# Patient Record
Sex: MALE | Race: WHITE | URBAN - METROPOLITAN AREA
[De-identification: names, ages, dates, MRNs, and addresses within clinical notes are randomized per-mention and may not be internally consistent; named-entity substitution may affect disease eponyms.]

---

## 2019-11-15 ENCOUNTER — HOSPITAL ENCOUNTER (OUTPATIENT)
Age: 64
Setting detail: SPECIMEN
Discharge: HOME | End: 2019-11-15

## 2019-11-20 LAB — SURGICAL PATHOLOGY REPORT: NORMAL

## 2024-02-07 ENCOUNTER — TRANSCRIBE ORDERS (OUTPATIENT)
Dept: ADMINISTRATIVE | Age: 69
End: 2024-02-07

## 2024-02-07 DIAGNOSIS — R01.1 HEART MURMUR: ICD-10-CM

## 2024-02-07 DIAGNOSIS — R09.89 BILATERAL CAROTID BRUITS: Primary | ICD-10-CM

## 2024-02-07 DIAGNOSIS — R06.09 DOE (DYSPNEA ON EXERTION): ICD-10-CM

## 2024-02-16 ENCOUNTER — HOSPITAL ENCOUNTER (OUTPATIENT)
Dept: VASCULAR LAB | Age: 69
End: 2024-02-16
Attending: FAMILY MEDICINE
Payer: MEDICARE

## 2024-02-16 ENCOUNTER — HOSPITAL ENCOUNTER (OUTPATIENT)
Age: 69
Discharge: HOME | End: 2024-02-18
Attending: FAMILY MEDICINE
Payer: MEDICARE

## 2024-02-16 VITALS — HEIGHT: 71 IN | BODY MASS INDEX: 25.9 KG/M2 | WEIGHT: 185 LBS

## 2024-02-16 DIAGNOSIS — R01.1 HEART MURMUR: ICD-10-CM

## 2024-02-16 DIAGNOSIS — R09.89 BILATERAL CAROTID BRUITS: ICD-10-CM

## 2024-02-16 DIAGNOSIS — R06.09 DOE (DYSPNEA ON EXERTION): ICD-10-CM

## 2024-02-16 LAB
ECHO AO ASC DIAM: 3 CM
ECHO AO ASCENDING AORTA INDEX: 1.47 CM/M2
ECHO AO ROOT DIAM: 3.4 CM
ECHO AO ROOT INDEX: 1.67 CM/M2
ECHO AV AREA PEAK VELOCITY: 4.3 CM2
ECHO AV AREA/BSA PEAK VELOCITY: 2.1 CM2/M2
ECHO AV PEAK GRADIENT: 5 MMHG
ECHO AV PEAK VELOCITY: 1.2 M/S
ECHO AV VELOCITY RATIO: 0.92
ECHO BSA: 2.05 M2
ECHO EST RA PRESSURE: 3 MMHG
ECHO IVC PROX: 1.4 CM
ECHO LA AREA 2C: 21.4 CM2
ECHO LA AREA 4C: 17.7 CM2
ECHO LA DIAMETER INDEX: 1.72 CM/M2
ECHO LA DIAMETER: 3.5 CM
ECHO LA MAJOR AXIS: 5.3 CM
ECHO LA MINOR AXIS: 5.2 CM
ECHO LA TO AORTIC ROOT RATIO: 1.03
ECHO LA VOL BP: 57 ML (ref 18–58)
ECHO LA VOL MOD A2C: 69 ML (ref 18–58)
ECHO LA VOL MOD A4C: 47 ML (ref 18–58)
ECHO LA VOL/BSA BIPLANE: 28 ML/M2 (ref 16–34)
ECHO LA VOLUME INDEX MOD A2C: 34 ML/M2 (ref 16–34)
ECHO LA VOLUME INDEX MOD A4C: 23 ML/M2 (ref 16–34)
ECHO LV E' LATERAL VELOCITY: 9 CM/S
ECHO LV E' SEPTAL VELOCITY: 6 CM/S
ECHO LV FRACTIONAL SHORTENING: 46 % (ref 28–44)
ECHO LV INTERNAL DIMENSION DIASTOLE INDEX: 2.45 CM/M2
ECHO LV INTERNAL DIMENSION DIASTOLIC: 5 CM (ref 4.2–5.9)
ECHO LV INTERNAL DIMENSION SYSTOLIC INDEX: 1.32 CM/M2
ECHO LV INTERNAL DIMENSION SYSTOLIC: 2.7 CM
ECHO LV IVSD: 0.7 CM (ref 0.6–1)
ECHO LV MASS 2D: 125.1 G (ref 88–224)
ECHO LV MASS INDEX 2D: 61.3 G/M2 (ref 49–115)
ECHO LV POSTERIOR WALL DIASTOLIC: 0.8 CM (ref 0.6–1)
ECHO LV RELATIVE WALL THICKNESS RATIO: 0.32
ECHO LVOT AREA: 4.5 CM2
ECHO LVOT DIAM: 2.4 CM
ECHO LVOT MEAN GRADIENT: 3 MMHG
ECHO LVOT PEAK GRADIENT: 5 MMHG
ECHO LVOT PEAK VELOCITY: 1.1 M/S
ECHO LVOT STROKE VOLUME INDEX: 54.5 ML/M2
ECHO LVOT SV: 111.2 ML
ECHO LVOT VTI: 24.6 CM
ECHO MV A VELOCITY: 0.7 M/S
ECHO MV E DECELERATION TIME (DT): 218 MS
ECHO MV E VELOCITY: 0.73 M/S
ECHO MV E/A RATIO: 1.04
ECHO MV E/E' LATERAL: 8.11
ECHO MV E/E' RATIO (AVERAGED): 10.14
ECHO PV MAX VELOCITY: 1 M/S
ECHO PV PEAK GRADIENT: 4 MMHG
ECHO RA AREA 4C: 16.8 CM2
ECHO RA END SYSTOLIC VOLUME APICAL 4 CHAMBER INDEX BSA: 19 ML/M2
ECHO RA VOLUME: 39 ML
ECHO RIGHT VENTRICULAR SYSTOLIC PRESSURE (RVSP): 13 MMHG
ECHO RV BASAL DIMENSION: 4.4 CM
ECHO RV FREE WALL PEAK S': 14 CM/S
ECHO RV TAPSE: 2.6 CM (ref 1.7–?)
ECHO TV REGURGITANT MAX VELOCITY: 1.6 M/S
ECHO TV REGURGITANT PEAK GRADIENT: 10 MMHG
VAS LEFT BULB EDV: 15.6 CM/S
VAS LEFT BULB PSV: 84 CM/S
VAS LEFT CCA DIST EDV: 22.2 CM/S
VAS LEFT CCA DIST PSV: 103 CM/S
VAS LEFT CCA MID EDV: 18.6 CM/S
VAS LEFT CCA MID PSV: 103 CM/S
VAS LEFT CCA PROX EDV: 14.4 CM/S
VAS LEFT CCA PROX PSV: 92.4 CM/S
VAS LEFT ECA EDV: 11.4 CM/S
VAS LEFT ECA PSV: 85.8 CM/S
VAS LEFT ICA DIST EDV: 26.3 CM/S
VAS LEFT ICA DIST PSV: 84.4 CM/S
VAS LEFT ICA MID EDV: 22.6 CM/S
VAS LEFT ICA MID PSV: 77.9 CM/S
VAS LEFT ICA PROX EDV: 21.5 CM/S
VAS LEFT ICA PROX PSV: 72 CM/S
VAS LEFT ICA/CCA PSV: 0.82
VAS LEFT VERTEBRAL EDV: 15.1 CM/S
VAS LEFT VERTEBRAL PSV: 55.9 CM/S
VAS RIGHT BULB EDV: 9.5 CM/S
VAS RIGHT BULB PSV: 57.8 CM/S
VAS RIGHT CCA DIST EDV: 19.7 CM/S
VAS RIGHT CCA DIST PSV: 113 CM/S
VAS RIGHT CCA MID EDV: 17.7 CM/S
VAS RIGHT CCA MID PSV: 116 CM/S
VAS RIGHT CCA PROX EDV: 17.7 CM/S
VAS RIGHT CCA PROX PSV: 124 CM/S
VAS RIGHT ECA EDV: 12.2 CM/S
VAS RIGHT ECA PSV: 99.9 CM/S
VAS RIGHT ICA DIST EDV: 25.1 CM/S
VAS RIGHT ICA DIST PSV: 92.4 CM/S
VAS RIGHT ICA MID EDV: 23.8 CM/S
VAS RIGHT ICA MID PSV: 94.4 CM/S
VAS RIGHT ICA PROX EDV: 13.6 CM/S
VAS RIGHT ICA PROX PSV: 85.6 CM/S
VAS RIGHT ICA/CCA PSV: 0.84
VAS RIGHT VERTEBRAL EDV: 12.5 CM/S
VAS RIGHT VERTEBRAL PSV: 51.9 CM/S

## 2024-02-16 PROCEDURE — 93306 TTE W/DOPPLER COMPLETE: CPT

## 2024-02-16 PROCEDURE — 93880 EXTRACRANIAL BILAT STUDY: CPT

## 2024-02-16 PROCEDURE — 93306 TTE W/DOPPLER COMPLETE: CPT | Performed by: INTERNAL MEDICINE

## 2024-02-16 PROCEDURE — 93880 EXTRACRANIAL BILAT STUDY: CPT | Performed by: SURGERY

## 2024-11-20 RX ORDER — ATORVASTATIN CALCIUM 40 MG/1
40 TABLET, FILM COATED ORAL DAILY
COMMUNITY
Start: 2024-08-22 | End: 2024-11-20 | Stop reason: SDUPTHER

## 2024-11-20 RX ORDER — ATORVASTATIN CALCIUM 40 MG/1
40 TABLET, FILM COATED ORAL DAILY
Qty: 30 TABLET | Refills: 2 | Status: SHIPPED | OUTPATIENT
Start: 2024-11-20

## 2024-12-13 SDOH — HEALTH STABILITY: PHYSICAL HEALTH: ON AVERAGE, HOW MANY MINUTES DO YOU ENGAGE IN EXERCISE AT THIS LEVEL?: 20 MIN

## 2024-12-13 SDOH — HEALTH STABILITY: PHYSICAL HEALTH: ON AVERAGE, HOW MANY DAYS PER WEEK DO YOU ENGAGE IN MODERATE TO STRENUOUS EXERCISE (LIKE A BRISK WALK)?: 7 DAYS

## 2024-12-16 ENCOUNTER — OFFICE VISIT (OUTPATIENT)
Age: 69
End: 2024-12-16
Payer: MEDICARE

## 2024-12-16 VITALS
DIASTOLIC BLOOD PRESSURE: 70 MMHG | BODY MASS INDEX: 26.32 KG/M2 | HEART RATE: 72 BPM | TEMPERATURE: 98.1 F | SYSTOLIC BLOOD PRESSURE: 132 MMHG | RESPIRATION RATE: 16 BRPM | HEIGHT: 71 IN | OXYGEN SATURATION: 99 % | WEIGHT: 188 LBS

## 2024-12-16 DIAGNOSIS — L30.9 DERMATITIS: ICD-10-CM

## 2024-12-16 DIAGNOSIS — R23.8 DRY SCALP: ICD-10-CM

## 2024-12-16 DIAGNOSIS — I10 PRIMARY HYPERTENSION: ICD-10-CM

## 2024-12-16 DIAGNOSIS — R21 SKIN RASH: ICD-10-CM

## 2024-12-16 DIAGNOSIS — L85.3 DRY SKIN: ICD-10-CM

## 2024-12-16 DIAGNOSIS — N40.1 LOWER URINARY TRACT SYMPTOMS DUE TO BENIGN PROSTATIC HYPERPLASIA: ICD-10-CM

## 2024-12-16 DIAGNOSIS — E78.2 MIXED HYPERLIPIDEMIA: ICD-10-CM

## 2024-12-16 DIAGNOSIS — E11.9 TYPE 2 DIABETES MELLITUS WITHOUT COMPLICATION, WITHOUT LONG-TERM CURRENT USE OF INSULIN (HCC): ICD-10-CM

## 2024-12-16 DIAGNOSIS — M54.32 SCIATICA OF LEFT SIDE: ICD-10-CM

## 2024-12-16 DIAGNOSIS — M79.641 BILATERAL HAND PAIN: ICD-10-CM

## 2024-12-16 DIAGNOSIS — E55.9 VITAMIN D DEFICIENCY, UNSPECIFIED: ICD-10-CM

## 2024-12-16 DIAGNOSIS — M79.642 BILATERAL HAND PAIN: ICD-10-CM

## 2024-12-16 DIAGNOSIS — G45.3 AMAUROSIS FUGAX, BOTH EYES: Primary | ICD-10-CM

## 2024-12-16 DIAGNOSIS — Z11.59 NEED FOR HEPATITIS C SCREENING TEST: ICD-10-CM

## 2024-12-16 DIAGNOSIS — Z00.00 WELL ADULT EXAM: ICD-10-CM

## 2024-12-16 DIAGNOSIS — G56.03 CARPAL TUNNEL SYNDROME ON BOTH SIDES: ICD-10-CM

## 2024-12-16 PROBLEM — E78.5 HYPERLIPIDEMIA: Status: ACTIVE | Noted: 2023-11-27

## 2024-12-16 PROCEDURE — G0439 PPPS, SUBSEQ VISIT: HCPCS | Performed by: FAMILY MEDICINE

## 2024-12-16 PROCEDURE — 1159F MED LIST DOCD IN RCRD: CPT | Performed by: FAMILY MEDICINE

## 2024-12-16 PROCEDURE — 1123F ACP DISCUSS/DSCN MKR DOCD: CPT | Performed by: FAMILY MEDICINE

## 2024-12-16 PROCEDURE — 3075F SYST BP GE 130 - 139MM HG: CPT | Performed by: FAMILY MEDICINE

## 2024-12-16 PROCEDURE — 3078F DIAST BP <80 MM HG: CPT | Performed by: FAMILY MEDICINE

## 2024-12-16 PROCEDURE — 3017F COLORECTAL CA SCREEN DOC REV: CPT | Performed by: FAMILY MEDICINE

## 2024-12-16 PROCEDURE — G8484 FLU IMMUNIZE NO ADMIN: HCPCS | Performed by: FAMILY MEDICINE

## 2024-12-16 PROCEDURE — 3046F HEMOGLOBIN A1C LEVEL >9.0%: CPT | Performed by: FAMILY MEDICINE

## 2024-12-16 RX ORDER — AMLODIPINE BESYLATE 5 MG/1
5 TABLET ORAL DAILY
COMMUNITY
End: 2024-12-16 | Stop reason: SDUPTHER

## 2024-12-16 RX ORDER — GLIMEPIRIDE 2 MG/1
2 TABLET ORAL NIGHTLY
COMMUNITY
End: 2024-12-16 | Stop reason: SDUPTHER

## 2024-12-16 RX ORDER — TAMSULOSIN HYDROCHLORIDE 0.4 MG/1
0.4 CAPSULE ORAL DAILY
COMMUNITY
End: 2024-12-16 | Stop reason: SDUPTHER

## 2024-12-16 RX ORDER — HYDROCORTISONE 25 MG/ML
LOTION TOPICAL
Qty: 1 EACH | Refills: 2 | Status: SHIPPED | OUTPATIENT
Start: 2024-12-16

## 2024-12-16 RX ORDER — TAMSULOSIN HYDROCHLORIDE 0.4 MG/1
0.4 CAPSULE ORAL DAILY
Qty: 90 CAPSULE | Refills: 1 | Status: SHIPPED | OUTPATIENT
Start: 2024-12-16 | End: 2025-03-16

## 2024-12-16 RX ORDER — AMLODIPINE BESYLATE 5 MG/1
5 TABLET ORAL DAILY
Qty: 90 TABLET | Refills: 1 | Status: SHIPPED | OUTPATIENT
Start: 2024-12-16 | End: 2025-03-16

## 2024-12-16 RX ORDER — GLIMEPIRIDE 2 MG/1
2 TABLET ORAL NIGHTLY
Qty: 90 TABLET | Refills: 1 | Status: SHIPPED | OUTPATIENT
Start: 2024-12-16 | End: 2025-03-16

## 2024-12-16 RX ORDER — ATORVASTATIN CALCIUM 40 MG/1
40 TABLET, FILM COATED ORAL DAILY
Qty: 30 TABLET | Refills: 2 | Status: SHIPPED | OUTPATIENT
Start: 2024-12-16

## 2024-12-16 SDOH — ECONOMIC STABILITY: INCOME INSECURITY: HOW HARD IS IT FOR YOU TO PAY FOR THE VERY BASICS LIKE FOOD, HOUSING, MEDICAL CARE, AND HEATING?: NOT HARD AT ALL

## 2024-12-16 SDOH — ECONOMIC STABILITY: FOOD INSECURITY: WITHIN THE PAST 12 MONTHS, YOU WORRIED THAT YOUR FOOD WOULD RUN OUT BEFORE YOU GOT MONEY TO BUY MORE.: NEVER TRUE

## 2024-12-16 SDOH — ECONOMIC STABILITY: FOOD INSECURITY: WITHIN THE PAST 12 MONTHS, THE FOOD YOU BOUGHT JUST DIDN'T LAST AND YOU DIDN'T HAVE MONEY TO GET MORE.: NEVER TRUE

## 2024-12-16 ASSESSMENT — PATIENT HEALTH QUESTIONNAIRE - PHQ9
SUM OF ALL RESPONSES TO PHQ QUESTIONS 1-9: 0
1. LITTLE INTEREST OR PLEASURE IN DOING THINGS: NOT AT ALL
2. FEELING DOWN, DEPRESSED OR HOPELESS: NOT AT ALL
SUM OF ALL RESPONSES TO PHQ QUESTIONS 1-9: 0
SUM OF ALL RESPONSES TO PHQ9 QUESTIONS 1 & 2: 0

## 2024-12-16 NOTE — PROGRESS NOTES
[facility]  [facility]  [address]  [address]  [facility] [address]  Dept: [phone]     Date of Visit:  2024  Patient Name: Corky Krause   Patient :  1955     Corky Krause is a 69 y.o. male who presents today for an general visit to be evaluated for the following condition(s):  Chief Complaint   Patient presents with    New Patient     New patient coming from [facility], pain in thumb getting worse, discuss steroid shot, concerns for arthritis, or carpal tunnel. Right is worse than left but still bad. Bump on right shoulder that turned into a rash, possibly eczema , on left side as well. Discuss seeing a dermatologist. Discuss skin issues on scalp.        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  Patient has some pain to the thenar area some numbness he feels some weakness in the hand.  He is supposed to use a splint at night for his carpal tunnel brace that he forgets.  Current Outpatient Medications   Medication Sig Dispense Refill    atorvastatin (LIPITOR) 40 MG tablet Take 1 tablet by mouth daily 30 tablet 2    metFORMIN (GLUCOPHAGE) 500 MG tablet Take 2 tablets by mouth 2 times daily 60 tablet 2    amLODIPine (NORVASC) 5 MG tablet Take 1 tablet by mouth daily 90 tablet 1    glimepiride (AMARYL) 2 MG tablet Take 1 tablet by mouth nightly at bedtime. 90 tablet 1    tamsulosin (FLOMAX) 0.4 MG capsule Take 1 capsule by mouth daily 90 capsule 1    hydrocortisone (HYTONE) 2.5 % lotion Apply topically 2

## 2025-01-13 RX ORDER — ATORVASTATIN CALCIUM 40 MG/1
40 TABLET, FILM COATED ORAL DAILY
Qty: 90 TABLET | Refills: 1 | Status: SHIPPED | OUTPATIENT
Start: 2025-01-13

## 2025-01-13 RX ORDER — AMLODIPINE BESYLATE 5 MG/1
5 TABLET ORAL DAILY
Qty: 90 TABLET | Refills: 1 | Status: SHIPPED | OUTPATIENT
Start: 2025-01-13 | End: 2025-04-13

## 2025-01-21 DIAGNOSIS — E11.9 TYPE 2 DIABETES MELLITUS WITHOUT COMPLICATION, WITHOUT LONG-TERM CURRENT USE OF INSULIN (HCC): ICD-10-CM

## 2025-02-18 ENCOUNTER — TELEPHONE (OUTPATIENT)
Age: 70
End: 2025-02-18

## 2025-02-18 RX ORDER — OSELTAMIVIR PHOSPHATE 75 MG/1
75 CAPSULE ORAL 2 TIMES DAILY
Qty: 10 CAPSULE | Refills: 0 | Status: SHIPPED | OUTPATIENT
Start: 2025-02-18 | End: 2025-02-23

## 2025-02-18 NOTE — TELEPHONE ENCOUNTER
Pt has flu like sx Runny nose cough from chest has fever 100.7 three days with fatigue achy joints spouse is asking if there is anything he can take to get better pt did not get flu vaccination this year also the daughter is getting  this weekend pt asking what could help get better

## 2025-02-18 NOTE — TELEPHONE ENCOUNTER
Sounds like the flue will be nice marty get a fklue test we can start him on Tamiflue if he can come in tomorrow be checked

## 2025-04-28 NOTE — TELEPHONE ENCOUNTER
Request for 1 medication.      Next Visit Date:  No future appointments.    Health Maintenance   Topic Date Due    Diabetic foot exam  Never done    A1C test (Diabetic or Prediabetic)  Never done    Lipids  Never done    Diabetic Alb to Cr ratio (uACR) test  Never done    Diabetic retinal exam  Never done    Hepatitis C screen  Never done    Shingles vaccine (1 of 2) Never done    GFR test (Diabetes, CKD 3-4, OR last GFR 15-59)  12/01/2013    Respiratory Syncytial Virus (RSV) Pregnant or age 60 yrs+ (1 - Risk 60-74 years 1-dose series) Never done    Pneumococcal 50+ years Vaccine (2 of 2 - PCV) 09/30/2023    COVID-19 Vaccine (3 - 2024-25 season) 09/01/2024    Flu vaccine (Season Ended) 08/01/2025    DTaP/Tdap/Td vaccine (2 - Td or Tdap) 11/29/2025    Depression Screen  12/16/2025    Annual Wellness Visit (Medicare)  12/17/2025    Colorectal Cancer Screen  11/14/2029    Hepatitis A vaccine  Aged Out    Hepatitis B vaccine  Aged Out    Hib vaccine  Aged Out    Polio vaccine  Aged Out    Meningococcal (ACWY) vaccine  Aged Out    Meningococcal B vaccine  Aged Out       No results found for: \"LABA1C\"          ( goal A1C is < 7)   No components found for: \"LABMICR\"  No components found for: \"LDLCHOLESTEROL\", \"LDLCALC\"    (goal LDL is <100)   BUN (mg/dL)   Date Value   12/01/2012 18     BP Readings from Last 3 Encounters:   12/16/24 132/70          (goal 120/80)    All Future Testing planned in [facility]  Lab Frequency Next Occurrence   CBC with Auto Differential Once 12/23/2024   Basic Metabolic Panel Once 12/23/2024   Lipid, Fasting Once 12/16/2024   Hepatic Function Panel Once 12/23/2024   Albumin/Creatinine Ratio, Urine Once 12/16/2024   PSA Screening Once 12/23/2024   TSH with Reflex Once 12/16/2024   Uric Acid Once 12/23/2024   Urinalysis with Reflex to Culture Once 12/23/2024   Vitamin D 25 Hydroxy Once 12/23/2024   Hepatitis C Antibody Once 12/16/2024   Hemoglobin A1C [LAB90} Once 01/21/2025         Patient Active

## 2025-07-10 RX ORDER — AMLODIPINE BESYLATE 5 MG/1
5 TABLET ORAL DAILY
Qty: 90 TABLET | Refills: 1 | Status: SHIPPED | OUTPATIENT
Start: 2025-07-10 | End: 2025-10-08

## 2025-07-10 RX ORDER — TAMSULOSIN HYDROCHLORIDE 0.4 MG/1
0.4 CAPSULE ORAL DAILY
Qty: 90 CAPSULE | Refills: 1 | Status: SHIPPED | OUTPATIENT
Start: 2025-07-10 | End: 2025-10-08

## 2025-07-10 RX ORDER — GLIMEPIRIDE 2 MG/1
2 TABLET ORAL NIGHTLY
Qty: 90 TABLET | Refills: 1 | Status: SHIPPED | OUTPATIENT
Start: 2025-07-10 | End: 2025-10-08

## 2025-07-10 RX ORDER — ATORVASTATIN CALCIUM 40 MG/1
40 TABLET, FILM COATED ORAL DAILY
Qty: 90 TABLET | Refills: 1 | Status: SHIPPED | OUTPATIENT
Start: 2025-07-10

## 2025-08-05 ENCOUNTER — TELEPHONE (OUTPATIENT)
Age: 70
End: 2025-08-05

## 2025-08-05 DIAGNOSIS — D22.9 ATYPICAL MOLE: Primary | ICD-10-CM

## 2025-08-25 ENCOUNTER — OFFICE VISIT (OUTPATIENT)
Age: 70
End: 2025-08-25
Payer: MEDICARE

## 2025-08-25 VITALS
HEIGHT: 71 IN | HEART RATE: 87 BPM | RESPIRATION RATE: 12 BRPM | SYSTOLIC BLOOD PRESSURE: 136 MMHG | OXYGEN SATURATION: 97 % | DIASTOLIC BLOOD PRESSURE: 64 MMHG | BODY MASS INDEX: 26.08 KG/M2 | TEMPERATURE: 97.4 F | WEIGHT: 186.3 LBS

## 2025-08-25 DIAGNOSIS — E11.9 TYPE 2 DIABETES MELLITUS WITHOUT COMPLICATION, WITHOUT LONG-TERM CURRENT USE OF INSULIN (HCC): Primary | ICD-10-CM

## 2025-08-25 DIAGNOSIS — M79.641 BILATERAL HAND PAIN: ICD-10-CM

## 2025-08-25 DIAGNOSIS — E78.2 MIXED HYPERLIPIDEMIA: ICD-10-CM

## 2025-08-25 DIAGNOSIS — M54.32 SCIATICA OF LEFT SIDE: ICD-10-CM

## 2025-08-25 DIAGNOSIS — G56.03 CARPAL TUNNEL SYNDROME ON BOTH SIDES: ICD-10-CM

## 2025-08-25 DIAGNOSIS — D22.9 ATYPICAL MOLE: ICD-10-CM

## 2025-08-25 DIAGNOSIS — R23.8 DRY SCALP: ICD-10-CM

## 2025-08-25 DIAGNOSIS — M79.642 BILATERAL HAND PAIN: ICD-10-CM

## 2025-08-25 DIAGNOSIS — L30.9 DERMATITIS: ICD-10-CM

## 2025-08-25 DIAGNOSIS — I10 PRIMARY HYPERTENSION: ICD-10-CM

## 2025-08-25 DIAGNOSIS — L85.3 DRY SKIN: ICD-10-CM

## 2025-08-25 DIAGNOSIS — E55.9 VITAMIN D DEFICIENCY, UNSPECIFIED: ICD-10-CM

## 2025-08-25 DIAGNOSIS — N40.1 LOWER URINARY TRACT SYMPTOMS DUE TO BENIGN PROSTATIC HYPERPLASIA: ICD-10-CM

## 2025-08-25 DIAGNOSIS — Z11.59 NEED FOR HEPATITIS C SCREENING TEST: ICD-10-CM

## 2025-08-25 DIAGNOSIS — R21 SKIN RASH: ICD-10-CM

## 2025-08-25 LAB — HBA1C MFR BLD: 6.2 %

## 2025-08-25 PROCEDURE — G8427 DOCREV CUR MEDS BY ELIG CLIN: HCPCS | Performed by: FAMILY MEDICINE

## 2025-08-25 PROCEDURE — 1123F ACP DISCUSS/DSCN MKR DOCD: CPT | Performed by: FAMILY MEDICINE

## 2025-08-25 PROCEDURE — 99214 OFFICE O/P EST MOD 30 MIN: CPT | Performed by: FAMILY MEDICINE

## 2025-08-25 PROCEDURE — 2022F DILAT RTA XM EVC RTNOPTHY: CPT | Performed by: FAMILY MEDICINE

## 2025-08-25 PROCEDURE — 3044F HG A1C LEVEL LT 7.0%: CPT | Performed by: FAMILY MEDICINE

## 2025-08-25 PROCEDURE — 3017F COLORECTAL CA SCREEN DOC REV: CPT | Performed by: FAMILY MEDICINE

## 2025-08-25 PROCEDURE — 83036 HEMOGLOBIN GLYCOSYLATED A1C: CPT | Performed by: FAMILY MEDICINE

## 2025-08-25 PROCEDURE — 4004F PT TOBACCO SCREEN RCVD TLK: CPT | Performed by: FAMILY MEDICINE

## 2025-08-25 PROCEDURE — 1159F MED LIST DOCD IN RCRD: CPT | Performed by: FAMILY MEDICINE

## 2025-08-25 PROCEDURE — 3075F SYST BP GE 130 - 139MM HG: CPT | Performed by: FAMILY MEDICINE

## 2025-08-25 PROCEDURE — 3078F DIAST BP <80 MM HG: CPT | Performed by: FAMILY MEDICINE

## 2025-08-25 PROCEDURE — G8419 CALC BMI OUT NRM PARAM NOF/U: HCPCS | Performed by: FAMILY MEDICINE

## 2025-08-25 RX ORDER — LIDOCAINE HYDROCHLORIDE 10 MG/ML
1 INJECTION, SOLUTION EPIDURAL; INFILTRATION; INTRACAUDAL; PERINEURAL ONCE
Qty: 1 ML | Refills: 0
Start: 2025-08-25 | End: 2025-08-25

## 2025-08-25 RX ORDER — BETAMETHASONE SODIUM PHOSPHATE AND BETAMETHASONE ACETATE 3; 3 MG/ML; MG/ML
6 INJECTION, SUSPENSION INTRA-ARTICULAR; INTRALESIONAL; INTRAMUSCULAR; SOFT TISSUE ONCE
Status: SHIPPED | OUTPATIENT
Start: 2025-08-25

## 2025-08-25 SDOH — ECONOMIC STABILITY: FOOD INSECURITY: WITHIN THE PAST 12 MONTHS, THE FOOD YOU BOUGHT JUST DIDN'T LAST AND YOU DIDN'T HAVE MONEY TO GET MORE.: NEVER TRUE

## 2025-08-25 SDOH — ECONOMIC STABILITY: FOOD INSECURITY: WITHIN THE PAST 12 MONTHS, YOU WORRIED THAT YOUR FOOD WOULD RUN OUT BEFORE YOU GOT MONEY TO BUY MORE.: NEVER TRUE

## 2025-08-25 ASSESSMENT — PATIENT HEALTH QUESTIONNAIRE - PHQ9
2. FEELING DOWN, DEPRESSED OR HOPELESS: NOT AT ALL
SUM OF ALL RESPONSES TO PHQ QUESTIONS 1-9: 0
1. LITTLE INTEREST OR PLEASURE IN DOING THINGS: NOT AT ALL
SUM OF ALL RESPONSES TO PHQ QUESTIONS 1-9: 0